# Patient Record
Sex: FEMALE | Race: BLACK OR AFRICAN AMERICAN | Employment: UNEMPLOYED | URBAN - METROPOLITAN AREA
[De-identification: names, ages, dates, MRNs, and addresses within clinical notes are randomized per-mention and may not be internally consistent; named-entity substitution may affect disease eponyms.]

---

## 2017-01-10 ENCOUNTER — TELEPHONE (OUTPATIENT)
Dept: FAMILY MEDICINE | Facility: CLINIC | Age: 52
End: 2017-01-10

## 2017-01-10 NOTE — TELEPHONE ENCOUNTER
Pt needs a P.A. For her Omega-3 1g capsules (ndc: [address]-34) qty:180 ds: 90  ID#:[id number]  Phone#: [phone]  Our NCPDP: [id number]    Thanks!  Dilshad Jaimes [facility]- Float Technician

## 2017-03-30 ENCOUNTER — HOSPITAL ENCOUNTER (EMERGENCY)
Facility: CLINIC | Age: 52
Discharge: HOME | End: 2017-03-30
Attending: FAMILY MEDICINE | Admitting: FAMILY MEDICINE
Payer: COMMERCIAL

## 2017-03-30 ENCOUNTER — APPOINTMENT (OUTPATIENT)
Dept: ULTRASOUND IMAGING | Facility: CLINIC | Age: 52
End: 2017-03-30
Payer: COMMERCIAL

## 2017-03-30 VITALS
OXYGEN SATURATION: 97 % | TEMPERATURE: 97.7 F | RESPIRATION RATE: 16 BRPM | HEART RATE: 89 BPM | DIASTOLIC BLOOD PRESSURE: 78 MMHG | SYSTOLIC BLOOD PRESSURE: 140 MMHG

## 2017-03-30 DIAGNOSIS — K29.00 OTHER ACUTE GASTRITIS: ICD-10-CM

## 2017-03-30 DIAGNOSIS — R10.13 ABDOMINAL PAIN, EPIGASTRIC: ICD-10-CM

## 2017-03-30 DIAGNOSIS — R30.0 DYSURIA: ICD-10-CM

## 2017-03-30 DIAGNOSIS — N39.0 URINARY TRACT INFECTION, SITE NOT SPECIFIED: ICD-10-CM

## 2017-03-30 DIAGNOSIS — R11.2 NON-INTRACTABLE VOMITING WITH NAUSEA, UNSPECIFIED VOMITING TYPE: ICD-10-CM

## 2017-03-30 LAB
ALBUMIN SERPL-MCNC: 3.7 G/DL (ref 3.4–5)
ALBUMIN UR-MCNC: NEGATIVE MG/DL
ALP SERPL-CCNC: 53 U/L (ref 40–150)
ALT SERPL W P-5'-P-CCNC: 19 U/L (ref 0–50)
ANION GAP SERPL CALCULATED.3IONS-SCNC: 9 MMOL/L (ref 3–14)
APPEARANCE UR: CLEAR
AST SERPL W P-5'-P-CCNC: 11 U/L (ref 0–45)
BASOPHILS # BLD AUTO: 0 10E9/L (ref 0–0.2)
BASOPHILS NFR BLD AUTO: 0.3 %
BILIRUB SERPL-MCNC: 0.4 MG/DL (ref 0.2–1.3)
BILIRUB UR QL STRIP: NEGATIVE
BUN SERPL-MCNC: 8 MG/DL (ref 7–30)
CALCIUM SERPL-MCNC: 8.9 MG/DL (ref 8.5–10.1)
CHLORIDE SERPL-SCNC: 102 MMOL/L (ref 94–109)
CO2 SERPL-SCNC: 28 MMOL/L (ref 20–32)
COLOR UR AUTO: ABNORMAL
CREAT SERPL-MCNC: 0.42 MG/DL (ref 0.52–1.04)
DIFFERENTIAL METHOD BLD: ABNORMAL
EOSINOPHIL # BLD AUTO: 0 10E9/L (ref 0–0.7)
EOSINOPHIL NFR BLD AUTO: 0.5 %
ERYTHROCYTE [DISTWIDTH] IN BLOOD BY AUTOMATED COUNT: 12.6 % (ref 10–15)
GFR SERPL CREATININE-BSD FRML MDRD: ABNORMAL ML/MIN/1.7M2
GLUCOSE BLDC GLUCOMTR-MCNC: 214 MG/DL (ref 70–99)
GLUCOSE SERPL-MCNC: 233 MG/DL (ref 70–99)
GLUCOSE UR STRIP-MCNC: 150 MG/DL
HCT VFR BLD AUTO: 38.8 % (ref 35–47)
HGB BLD-MCNC: 13.1 G/DL (ref 11.7–15.7)
HGB UR QL STRIP: NEGATIVE
IMM GRANULOCYTES # BLD: 0 10E9/L (ref 0–0.4)
IMM GRANULOCYTES NFR BLD: 0.3 %
KETONES UR STRIP-MCNC: NEGATIVE MG/DL
LEUKOCYTE ESTERASE UR QL STRIP: ABNORMAL
LYMPHOCYTES # BLD AUTO: 1.1 10E9/L (ref 0.8–5.3)
LYMPHOCYTES NFR BLD AUTO: 28.3 %
MCH RBC QN AUTO: 29.6 PG (ref 26.5–33)
MCHC RBC AUTO-ENTMCNC: 33.8 G/DL (ref 31.5–36.5)
MCV RBC AUTO: 88 FL (ref 78–100)
MONOCYTES # BLD AUTO: 0.2 10E9/L (ref 0–1.3)
MONOCYTES NFR BLD AUTO: 4.4 %
MUCOUS THREADS #/AREA URNS LPF: PRESENT /LPF
NEUTROPHILS # BLD AUTO: 2.6 10E9/L (ref 1.6–8.3)
NEUTROPHILS NFR BLD AUTO: 66.2 %
NITRATE UR QL: NEGATIVE
NRBC # BLD AUTO: 0 10*3/UL
NRBC BLD AUTO-RTO: 0 /100
PH UR STRIP: 7 PH (ref 5–7)
PLATELET # BLD AUTO: 115 10E9/L (ref 150–450)
POTASSIUM SERPL-SCNC: 3.3 MMOL/L (ref 3.4–5.3)
PROT SERPL-MCNC: 7.1 G/DL (ref 6.8–8.8)
RBC # BLD AUTO: 4.42 10E12/L (ref 3.8–5.2)
RBC #/AREA URNS AUTO: 1 /HPF (ref 0–2)
SODIUM SERPL-SCNC: 140 MMOL/L (ref 133–144)
SP GR UR STRIP: 1 (ref 1–1.03)
SQUAMOUS #/AREA URNS AUTO: <1 /HPF (ref 0–1)
URN SPEC COLLECT METH UR: ABNORMAL
UROBILINOGEN UR STRIP-MCNC: NORMAL MG/DL (ref 0–2)
WBC # BLD AUTO: 3.9 10E9/L (ref 4–11)
WBC #/AREA URNS AUTO: 20 /HPF (ref 0–2)

## 2017-03-30 PROCEDURE — 00000146 ZZHCL STATISTIC GLUCOSE BY METER IP

## 2017-03-30 PROCEDURE — 80053 COMPREHEN METABOLIC PANEL: CPT | Performed by: EMERGENCY MEDICINE

## 2017-03-30 PROCEDURE — 81001 URINALYSIS AUTO W/SCOPE: CPT | Performed by: FAMILY MEDICINE

## 2017-03-30 PROCEDURE — 87086 URINE CULTURE/COLONY COUNT: CPT | Performed by: FAMILY MEDICINE

## 2017-03-30 PROCEDURE — 83690 ASSAY OF LIPASE: CPT | Performed by: EMERGENCY MEDICINE

## 2017-03-30 PROCEDURE — 85025 COMPLETE CBC W/AUTO DIFF WBC: CPT | Performed by: EMERGENCY MEDICINE

## 2017-03-30 PROCEDURE — 96365 THER/PROPH/DIAG IV INF INIT: CPT | Performed by: FAMILY MEDICINE

## 2017-03-30 PROCEDURE — 93005 ELECTROCARDIOGRAM TRACING: CPT | Performed by: FAMILY MEDICINE

## 2017-03-30 PROCEDURE — 93010 ELECTROCARDIOGRAM REPORT: CPT | Mod: Z6 | Performed by: FAMILY MEDICINE

## 2017-03-30 PROCEDURE — 84484 ASSAY OF TROPONIN QUANT: CPT | Performed by: EMERGENCY MEDICINE

## 2017-03-30 PROCEDURE — 99285 EMERGENCY DEPT VISIT HI MDM: CPT | Mod: 25 | Performed by: FAMILY MEDICINE

## 2017-03-30 PROCEDURE — 25000125 ZZHC RX 250: Performed by: FAMILY MEDICINE

## 2017-03-30 PROCEDURE — 96375 TX/PRO/DX INJ NEW DRUG ADDON: CPT | Performed by: FAMILY MEDICINE

## 2017-03-30 PROCEDURE — 99284 EMERGENCY DEPT VISIT MOD MDM: CPT | Mod: 25 | Performed by: FAMILY MEDICINE

## 2017-03-30 PROCEDURE — 96361 HYDRATE IV INFUSION ADD-ON: CPT | Performed by: FAMILY MEDICINE

## 2017-03-30 PROCEDURE — 76705 ECHO EXAM OF ABDOMEN: CPT

## 2017-03-30 PROCEDURE — 25000128 H RX IP 250 OP 636: Performed by: FAMILY MEDICINE

## 2017-03-30 RX ORDER — ONDANSETRON 4 MG/1
4 TABLET, ORALLY DISINTEGRATING ORAL EVERY 6 HOURS PRN
Qty: 10 TABLET | Refills: 0 | Status: SHIPPED | OUTPATIENT
Start: 2017-03-30 | End: 2017-04-02

## 2017-03-30 RX ORDER — CEFTRIAXONE 1 G/1
1 INJECTION, POWDER, FOR SOLUTION INTRAMUSCULAR; INTRAVENOUS ONCE
Status: COMPLETED | OUTPATIENT
Start: 2017-03-30 | End: 2017-03-30

## 2017-03-30 RX ORDER — CIPROFLOXACIN 500 MG/1
500 TABLET, FILM COATED ORAL 2 TIMES DAILY
Qty: 14 TABLET | Refills: 0 | Status: SHIPPED | OUTPATIENT
Start: 2017-03-30 | End: 2017-04-06

## 2017-03-30 RX ORDER — ONDANSETRON 2 MG/ML
4 INJECTION INTRAMUSCULAR; INTRAVENOUS ONCE
Status: COMPLETED | OUTPATIENT
Start: 2017-03-30 | End: 2017-03-30

## 2017-03-30 RX ADMIN — CEFTRIAXONE 1 G: 1 INJECTION, POWDER, FOR SOLUTION INTRAMUSCULAR; INTRAVENOUS at 21:49

## 2017-03-30 RX ADMIN — PANTOPRAZOLE SODIUM 40 MG: 40 INJECTION, POWDER, FOR SOLUTION INTRAVENOUS at 21:14

## 2017-03-30 RX ADMIN — ONDANSETRON 4 MG: 2 INJECTION INTRAMUSCULAR; INTRAVENOUS at 19:45

## 2017-03-30 RX ADMIN — SODIUM CHLORIDE 1000 ML: 900 INJECTION, SOLUTION INTRAVENOUS at 19:42

## 2017-03-30 ASSESSMENT — ENCOUNTER SYMPTOMS
FEVER: 0
ABDOMINAL PAIN: 1
NAUSEA: 1
CHILLS: 1
PALPITATIONS: 0
BLOOD IN STOOL: 0
ANAL BLEEDING: 0
CONSTIPATION: 0
VOMITING: 1
ACTIVITY CHANGE: 1
FATIGUE: 0
DIAPHORESIS: 0
ABDOMINAL DISTENTION: 0
UNEXPECTED WEIGHT CHANGE: 0
SHORTNESS OF BREATH: 0
APPETITE CHANGE: 1
RECTAL PAIN: 0
DIARRHEA: 0
WHEEZING: 0
COUGH: 0

## 2017-03-30 NOTE — ED NOTES
Arrived to ED d/t c/o vomiting w/o nausea, has been ongoing for 3 days, throws up 2-3 times per day after drinking liquids and taking medication, hx of DM, HTN, kidney disease, VSS upon arrival

## 2017-03-30 NOTE — ED AVS SNAPSHOT
[facility], [place], Emergency Department    [address] [place] [id number]    Phone:  [phone]                                       Sylvie Galvan   MRN: [id number]    Department:  [facility], Emergency Department   Date of Visit:  3/30/2017           After Visit Summary Signature Page     I have received my discharge instructions, and my questions have been answered. I have discussed any challenges I see with this plan with the nurse or doctor.    ..........................................................................................................................................  Patient/Patient Representative Signature      ..........................................................................................................................................  Patient Representative Print Name and Relationship to Patient    ..................................................               ................................................  Date                                            Time    ..........................................................................................................................................  Reviewed by Signature/Title    ...................................................              ..............................................  Date                                                            Time

## 2017-03-30 NOTE — ED AVS SNAPSHOT
[facility], Emergency Department    [address] [address]    Phone:  [phone]                                       Sylvie Galvan   MRN: [id number]    Department:  [facility], Emergency Department   Date of Visit:  3/30/2017           Patient Information     Date Of Birth          1965        Your diagnoses for this visit were:     Other acute gastritis     Dysuria        You were seen by Andrew Moore MD.      Follow-up Information     Follow up with Clinic, [facility] In 1 week.    Contact information:    [address] [place] [address] [phone]          Discharge Instructions         You were seen today in ED for an acute gastritis and urinary tract infection.   - Please take Zofran for nausea as needed every 6 hours and make you are drinking lot of fluids.  - Please take antibiotic Ciprofloxacin twice daily for 7 days for urinary tract infection   - please use biotene spray for dry mouth as needed  - please follow up with your primary care doctor next week Monday or Tuesday.     Gastritis or Ulcer (No Antibiotic Treatment)    Gastritis is irritation and inflammation of the stomach lining. This means the lining is red and swollen. An ulcer is an open sore in the lining of the stomach. It may also occur in the duodenum (first part of the small intestine). The causes and symptoms of gastritis and ulcers are very similar.  Causes and risk factors for both problems can include:    Long-term use of nonsteroidal anti-inflammatory drugs (NSAIDs), such as aspirin and ibuprofen    H. pylori bacteria infection    Tobacco use    Alcohol use  Symptoms for both problems can include:    Dull or burning pain in the upper part of the belly    Loss of appetite    Heartburn or upset stomach    Frequent burping    Bloated feeling    Nausea with or without vomiting  You likely had an evaluation to help determine the exact cause and extent of your problem. This may have  included a health history, exam, and certain tests.  Results showed that your problem is not due to H. pylori infection. For this reason, no antibiotics are needed as part of your treatment.  Whether your problem is found to be gastritis or an ulcer, you will still need to take other medicines, however. You will also need to follow instructions to help reduce stomach irritation so your stomach can heal.   Home care    Take any medicines you re prescribed exactly as directed. Common medicines used to treat gastritis include:    Antacids. These help neutralize the normal acids in your stomach.    Proton pump inhibitors. These block your stomach from making any acid.    H2 blockers.These reduce the amount of acid your stomach makes.    Bismuth subsalicylate. This helps protect the lining of your stomach from acid.    Avoid taking any NSAIDS during your treatment. If you take NSAID to help treat other health problems, tell your healthcare provider. He or she may need to adjust your medicine plan or change the dosage.    Don t use tobacco. Also don t drink alcohol. These products can increase the amount of acid your stomach makes. This can delay healing. It can also worsen symptoms.  Follow-up care  Follow up with your healthcare provider, or as advised. In some cases, further testing may be needed.  When to seek medical advice  Call your healthcare provider right away if any of these occur:    Fever of 100.4 F (38 C) or higher or as directed by your provider    Stomach pain that worsens or moves to the lower right part of abdomen    Extreme fatigue    Weakness or dizziness    Continued weight loss    Frequent vomiting, blood in your vomit, or coffee ground-like substance in your vomit    Black, tarry, or bloody stools  Call 911  Call 911 right away if any of these occur:    Chest pain appears or worsens, or spreads to the back, neck, shoulder, or arm    Unusually fast heart rate    Trouble breathing or  swallowing    Confusion    Extreme drowsiness or trouble waking up    Fainting    Large amounts of blood present in vomit or stool    2792-9899 [employer]. [address] [place] [address], [place] [address]. All rights reserved. This information is not intended as a substitute for professional medical care. Always follow your healthcare professional's instructions.          Dysuria with Uncertain Cause (Adult)    The urethra is the tube that allows urine to pass out of the body. In a woman, the urethra is the opening above the vagina. In men, the urethra is the opening on the tip of the penis. Dysuria is the feeling of pain or burning in the urethra when passing urine.  Dysuria can be caused by anything that irritates or inflames the urethra. This can be caused by an infection or chemical irritation. The most common cause of dysuria in adults is a bladder infection. This is diagnosed with a urine test. It requires treatment with an antibiotic.  Soaps, lotions, colognes, feminine hygiene products, and birth control jellies, creams, and foams can cause chemical irritation and dysuria. It will go away in 1 to 3 days after the last time you use these irritants.  Sexually transmitted disease (STD) from chlamydia or gonorrhea can cause dysuria. If your doctor suspects this, he or she may take a culture specimen. It will take about 3 days to get the results. Antibiotic treatment may be started before the culture test returns.  In women who have gone through menopause, dysuria can be a result of dryness in the lining of the urethra. This can be treated with hormones. Dysuria becomes long-term (chronic) when it lasts for weeks or months. You may need to see a specialist (urologist) to diagnose and treat chronic dysuria.  Home care  The following home care measures may help:    Avoid any chemicals or products that you suspect may be causing your symptoms.    If you were given a prescription medicine, take as directed  and take the entire amount.    If a culture was taken, do not have sex until you have been told that it is negative (no infection). Then follow your healthcare provider's advice to treat your condition.  If a culture was done and it is positive:    Both you and your sexual partner need to be treated, even if your partner has no symptoms.    Contact your healthcare provider or go to an urgent care clinic or the public health department to be examined and treated.    Do not have sex until both you and your partner have completed all antibiotic medicine and are told that you are no longer contagious.    Learn about  safe sex  practices and use these in the future. The safest sex is with a partner who has tested negative and only has sex with you. Condoms offer protection from spreading some STDs, including gonorrhea, chlamydia and HIV, but are not a guarantee.  Follow-up care  Follow up with your healthcare provider as advised by our staff. If a culture was taken, call as directed the result. If diagnosed with an STD, follow up with your provider or the public health department for complete STD screening, including HIV testing. For more information, contact the National STD Hotline at 125-579-3432.  When to seek medical advice  Call your healthcare provider right away if any of these occur:    No improvement after three days of treatment    Fever of 100.4 F (38 C) or higher, or as directed    Increasing back or abdominal pain    Inability to urinate because of pain    A new discharge from the urethra, vagina or penis    Painful sores on the penis    Rash or joint pain    Enlarged painful lymph nodes (lumps) in the groin    Testicle pain or swelling of the scrotum    8908-6276 [employer]. [address] [place] [address], [place] [address]. All rights reserved. This information is not intended as a substitute for professional medical care. Always follow your healthcare professional's instructions.          24 Hour  Appointment Hotline       To make an appointment at any [facility], call [phone] ([phone]). If you don't have a family doctor or clinic, we will help you find one. [facility] clinics are conveniently located to serve the needs of you and your family.             Review of your medicines      START taking        Dose / Directions Last dose taken    artificial saliva Aers spray   Dose:  1 spray   Quantity:  1 Bottle        Take 1 spray by mouth every 6 hours as needed for dry mouth   Refills:  0        ciprofloxacin 500 MG tablet   Commonly known as:  CIPRO   Dose:  500 mg   Quantity:  14 tablet        Take 1 tablet (500 mg) by mouth 2 times daily for 7 days   Refills:  0        ondansetron 4 MG ODT tab   Commonly known as:  ZOFRAN ODT   Dose:  4 mg   Quantity:  10 tablet        Take 1 tablet (4 mg) by mouth every 6 hours as needed for nausea   Refills:  0          Our records show that you are taking the medicines listed below. If these are incorrect, please call your family doctor or clinic.        Dose / Directions Last dose taken    * acetaminophen 650 MG CR tablet   Commonly known as:  TYLENOL 8 HOUR   Dose:  1300 mg   Quantity:  180 tablet        Take 2 tablets (1,300 mg) by mouth 2 times daily as needed for mild pain or fever   Refills:  1        * acetaminophen 325 MG tablet   Commonly known as:  TYLENOL   Dose:  650 mg   Quantity:  100 tablet        Take 2 tablets (650 mg) by mouth every 4 hours as needed for mild pain   Refills:  0        aspirin 81 MG chewable tablet   Dose:  81 mg   Quantity:  100 tablet        Take 1 tablet (81 mg) by mouth daily   Refills:  4        blood glucose lancets standard   Commonly known as:  no brand specified   Quantity:  1 kit        One-touch lancets. Use once daily.   Refills:  3        blood glucose monitoring meter device kit   Quantity:  1 kit        by In Vitro route daily Dispense one One Touch glucose monitor   Refills:  0        blood glucose  monitoring test strip   Commonly known as:  ONE TOUCH ULTRA   Quantity:  1 Box        Use strips to test blood sugar 3x weekly or for symptoms   Refills:  11        Blood Pressure Monitor Kit   Quantity:  1 kit        Take BP once weekly   Refills:  0        carboxymethylcellulose sodium 0.5 % Soln ophthalmic solution   Dose:  1 drop   Quantity:  10 mL   Generic drug:  carboxymethylcellulose        Place 0.05 mLs (1 drop) into both eyes 2 times daily as needed   Refills:  12        cholecalciferol 1000 UNITS capsule   Commonly known as:  vitamin  -D   Dose:  1 capsule   Quantity:  90 capsule        Take 1 capsule (1,000 Units) by mouth daily   Refills:  3        cycloSPORINE 0.05 % ophthalmic emulsion   Commonly known as:  RESTASIS   Dose:  1 drop   Quantity:  2 Box        Place 1 drop into both eyes every 12 hours   Refills:  0        dorzolamide-timolol 2-0.5 % ophthalmic solution   Commonly known as:  COSOPT   Dose:  1 drop        Place 1 drop into both eyes 2 times daily   Refills:  0        FINGERS SKIN CREAM 15-15 % Crea        Apply sparingly to affected area(s) 3 times a day   Refills:  0        INSULIN ASPART SC        Refills:  0        latanoprost 0.005 % ophthalmic solution   Commonly known as:  XALATAN   Dose:  1 drop        Place 1 drop into both eyes 2 times daily   Refills:  0        levETIRAcetam 750 MG 24 hr tablet   Commonly known as:  KEPPRA XR   Dose:  1500 mg   Quantity:  360 tablet        Take 2 tablets (1,500 mg) by mouth 2 times daily Take 2 tablets by mouth twice daily   Refills:  6        losartan 100 MG tablet   Commonly known as:  COZAAR   Dose:  100 mg   Quantity:  30 tablet        Take 1 tablet (100 mg) by mouth daily   Refills:  3        metFORMIN 1000 MG tablet   Commonly known as:  GLUCOPHAGE   Dose:  1000 mg   Quantity:  60 tablet        Take 1 tablet (1,000 mg) by mouth 2 times daily (with meals)   Refills:  2        multivitamin per tablet   Dose:  1 tablet        Take 1 tablet  by mouth daily.   Refills:  0        order for DME   Quantity:  1 Device        Equipment being ordered: Glucometer   Refills:  0        Propylene Glycol-Glycerin 1-0.3 % Soln   Dose:  1-2 drop   Quantity:  1 Bottle        Apply 1-2 drops to eye 3 times daily as needed   Refills:  2        * Notice:  This list has 2 medication(s) that are the same as other medications prescribed for you. Read the directions carefully, and ask your doctor or other care provider to review them with you.            Prescriptions were sent or printed at these locations (3 Prescriptions)                   Other Prescriptions                Printed at Department/Unit printer (3 of 3)         ondansetron (ZOFRAN ODT) 4 MG ODT tab               ciprofloxacin (CIPRO) 500 MG tablet               artificial saliva (BIOTENE MT) AERS spray                Procedures and tests performed during your visit     Procedure/Test Number of Times Performed    Abdomen US, limited (RUQ only) 1    CBC with platelets differential 2    Comprehensive metabolic panel 2    EKG 12-lead, complete 1    Glucose by meter 1    Glucose monitor nursing POCT 1    Lipase 1    Troponin I (now + 6 & 12 hrs) 1    UA with Microscopic reflex to Culture 1    Urine Culture Aerobic Bacterial 1      Orders Needing Specimen Collection     None      Pending Results     Date and Time Order Name Status Description    3/30/2017 2020 Urine Culture Aerobic Bacterial Preliminary     3/30/2017 1934 EKG 12-lead, complete Preliminary     3/30/2017 1934 Abdomen US, limited (RUQ only) Preliminary             Pending Culture Results     Date and Time Order Name Status Description    3/30/2017 2020 Urine Culture Aerobic Bacterial Preliminary             Thank you for choosing [facility]       Thank you for choosing [facility] for your care. Our goal is always to provide you with excellent care. Hearing back from our patients is one way we can continue to improve our services. Please take a few  "minutes to complete the written survey that you may receive in the mail after you visit with us. Thank you!        [email or web address] Information     [facility] lets you send messages to your doctor, view your test results, renew your prescriptions, schedule appointments and more. To sign up, go to [email or web address] . Click on \"Log in\" on the left side of the screen, which will take you to the Welcome page. Then click on \"Sign up Now\" on the right side of the page.     You will be asked to enter the access code listed below, as well as some personal information. Please follow the directions to create your username and password.     Your access code is: [email or web address]  Expires: 2017 10:46 PM     Your access code will  in 90 days. If you need help or a new code, please call your [facility] clinic or [phone].        Care EveryWhere ID     This is your Care EveryWhere ID. This could be used by other organizations to access your [facility] medical records  [id number]        After Visit Summary       This is your record. Keep this with you and show to your community pharmacist(s) and doctor(s) at your next visit.                  "

## 2017-03-31 LAB
BACTERIA SPEC CULT: NORMAL
INTERPRETATION ECG - MUSE: NORMAL
LIPASE SERPL-CCNC: 92 U/L (ref 73–393)
Lab: NORMAL
MICRO REPORT STATUS: NORMAL
SPECIMEN SOURCE: NORMAL
TROPONIN I SERPL-MCNC: NORMAL UG/L (ref 0–0.04)

## 2017-03-31 ASSESSMENT — ENCOUNTER SYMPTOMS
NECK STIFFNESS: 0
ARTHRALGIAS: 0
HEADACHES: 0
EYE REDNESS: 0
DIFFICULTY URINATING: 0
CONFUSION: 0
COLOR CHANGE: 0

## 2017-03-31 NOTE — ED PROVIDER NOTES
"  History     Chief Complaint   Patient presents with     Vomiting     HPI  Sylvie Galvan is a 49 year old female with a history of diabetes type II, hypertension, epilepsy and other chronic issues who presents today with vomiting for 3 days. Patient states that she is not able to eat or to drink for the last 3 days due to \"bad taste in the mouth\". She tried to take her medicaitons however could not keep it down. She also reports having epigastric abdominal pain, not radiating, feels like cramps. She has some chills but denies having fever. She denies having diarrhea, constipation, any urinary symptoms.  Patient is a former [facility] patient. She currently has her care thru [facility] so please see details for her current medication in Care Everywhere.      I have reviewed the Medications, Allergies, Past Medical and Surgical History, and Social History in the Epic system.    Review of Systems   Constitutional: Positive for activity change, appetite change and chills. Negative for diaphoresis, fatigue, fever and unexpected weight change.   HENT: Negative for congestion.    Eyes: Negative for redness.   Respiratory: Negative for cough, shortness of breath and wheezing.    Cardiovascular: Negative for chest pain, palpitations and leg swelling.   Gastrointestinal: Positive for abdominal pain (epigastric), nausea and vomiting (non-bloody, non-bilious). Negative for abdominal distention, anal bleeding, blood in stool, constipation, diarrhea and rectal pain.   Genitourinary: Negative for difficulty urinating.   Musculoskeletal: Negative for arthralgias and neck stiffness.   Skin: Negative for color change.   Neurological: Negative for headaches.   Psychiatric/Behavioral: Negative for confusion.   All other systems reviewed and are negative.      Physical Exam   BP: (!) 154/91  Pulse: 98  Temp: 97.7  F (36.5  C)  Resp: 16  SpO2: 98 %  Physical Exam   Constitutional: She is oriented to person, place, and time. She appears " well-developed and well-nourished. She appears distressed.   HENT:   Head: Normocephalic and atraumatic.   Mouth/Throat: Uvula is midline.   Dry oral mucosa   Eyes: Conjunctivae are normal. No scleral icterus.   Neck: Normal range of motion. Neck supple.   Cardiovascular: Normal rate, regular rhythm and normal heart sounds.    No murmur heard.  Pulmonary/Chest: Effort normal and breath sounds normal. No respiratory distress. She has no wheezes. She has no rales. She exhibits no tenderness.   Abdominal: Soft. Bowel sounds are normal. She exhibits no distension (epigastric abdominal pain , slightly radiating to the RUQ, no positive Boyd sign) and no mass. There is tenderness. There is no rebound and no guarding.   Musculoskeletal: Normal range of motion. She exhibits no edema or tenderness.   Neurological: She is alert and oriented to person, place, and time.   Skin: Skin is warm and dry. No rash noted. She is not diaphoretic. No erythema. No pallor.   Psychiatric: She has a normal mood and affect.   Nursing note and vitals reviewed.      ED Course     ED Course     Procedures         Patient evaluated in the ER an IV established patient received IV fluid normal saline bolus in ER.  Labs reviewed.  RUQ US: Normal ultrasound of the right upper quadrant on preliminary read  Urinalysis concerning for infection patient given a gram of Rocephin IV in the ER.  Patient received Protonix along with Zofran IV with improving symptoms.  EKG without ischemic changes       EKG Interpretation:      Interpreted by Ana Laura Wilson  Time reviewed: 7: 46  Symptoms at time of EKG: epigastric pain  Rhythm: normal sinus   Rate: normal  Axis: normal  Ectopy: none  Conduction: normal  ST Segments/ T Waves: No ST-T wave changes  Q Waves: none  Comparison to prior: No old EKG available    Clinical Impression: normal EKG       Labs Ordered and Resulted from Time of ED Arrival Up to the Time of Departure from the ED   CBC WITH PLATELETS  DIFFERENTIAL - Abnormal; Notable for the following:        Result Value    WBC 3.9 (*)     Platelet Count 115 (*)     All other components within normal limits   COMPREHENSIVE METABOLIC PANEL - Abnormal; Notable for the following:     Potassium 3.3 (*)     Glucose 233 (*)     Creatinine 0.42 (*)     All other components within normal limits   GLUCOSE BY METER - Abnormal; Notable for the following:     Glucose 214 (*)     All other components within normal limits   ROUTINE UA WITH MICROSCOPIC REFLEX TO CULTURE - Abnormal; Notable for the following:     Glucose Urine 150 (*)     Leukocyte Esterase Urine Large (*)     WBC Urine 20 (*)     Mucous Urine Present (*)     All other components within normal limits   GLUCOSE MONITOR NURSING POCT   CBC WITH PLATELETS DIFFERENTIAL   COMPREHENSIVE METABOLIC PANEL   LIPASE   TROPONIN I   URINE CULTURE AEROBIC BACTERIAL     Results for orders placed or performed during the hospital encounter of 03/30/17   Abdomen US, limited (RUQ only)    Narrative    Resident preliminary report:   Normal ultrasound of the right upper quadrant.   CBC with platelets differential   Result Value Ref Range    WBC 3.9 (L) 4.0 - 11.0 10e9/L    RBC Count 4.42 3.8 - 5.2 10e12/L    Hemoglobin 13.1 11.7 - 15.7 g/dL    Hematocrit 38.8 35.0 - 47.0 %    MCV 88 78 - 100 fl    MCH 29.6 26.5 - 33.0 pg    MCHC 33.8 31.5 - 36.5 g/dL    RDW 12.6 10.0 - 15.0 %    Platelet Count 115 (L) 150 - 450 10e9/L    Diff Method Automated Method     % Neutrophils 66.2 %    % Lymphocytes 28.3 %    % Monocytes 4.4 %    % Eosinophils 0.5 %    % Basophils 0.3 %    % Immature Granulocytes 0.3 %    Nucleated RBCs 0 0 /100    Absolute Neutrophil 2.6 1.6 - 8.3 10e9/L    Absolute Lymphocytes 1.1 0.8 - 5.3 10e9/L    Absolute Monocytes 0.2 0.0 - 1.3 10e9/L    Absolute Eosinophils 0.0 0.0 - 0.7 10e9/L    Absolute Basophils 0.0 0.0 - 0.2 10e9/L    Abs Immature Granulocytes 0.0 0 - 0.4 10e9/L    Absolute Nucleated RBC 0.0    Comprehensive  metabolic panel   Result Value Ref Range    Sodium 140 133 - 144 mmol/L    Potassium 3.3 (L) 3.4 - 5.3 mmol/L    Chloride 102 94 - 109 mmol/L    Carbon Dioxide 28 20 - 32 mmol/L    Anion Gap 9 3 - 14 mmol/L    Glucose 233 (H) 70 - 99 mg/dL    Urea Nitrogen 8 7 - 30 mg/dL    Creatinine 0.42 (L) 0.52 - 1.04 mg/dL    GFR Estimate >90  Non  GFR Calc   >60 mL/min/1.7m2    GFR Estimate If Black >90   GFR Calc   >60 mL/min/1.7m2    Calcium 8.9 8.5 - 10.1 mg/dL    Bilirubin Total 0.4 0.2 - 1.3 mg/dL    Albumin 3.7 3.4 - 5.0 g/dL    Protein Total 7.1 6.8 - 8.8 g/dL    Alkaline Phosphatase 53 40 - 150 U/L    ALT 19 0 - 50 U/L    AST 11 0 - 45 U/L   Glucose by meter   Result Value Ref Range    Glucose 214 (H) 70 - 99 mg/dL   UA with Microscopic reflex to Culture   Result Value Ref Range    Color Urine Light Yellow     Appearance Urine Clear     Glucose Urine 150 (A) NEG mg/dL    Bilirubin Urine Negative NEG    Ketones Urine Negative NEG mg/dL    Specific Gravity Urine 1.005 1.003 - 1.035    Blood Urine Negative NEG    pH Urine 7.0 5.0 - 7.0 pH    Protein Albumin Urine Negative NEG mg/dL    Urobilinogen mg/dL Normal 0.0 - 2.0 mg/dL    Nitrite Urine Negative NEG    Leukocyte Esterase Urine Large (A) NEG    Source Midstream Urine     WBC Urine 20 (H) 0 - 2 /HPF    RBC Urine 1 0 - 2 /HPF    Squamous Epithelial /HPF Urine <1 0 - 1 /HPF    Mucous Urine Present (A) NEG /LPF   EKG 12-lead, complete   Result Value Ref Range    Interpretation ECG Click View Image link to view waveform and result    Urine Culture Aerobic Bacterial   Result Value Ref Range    Specimen Description Midstream Urine     Special Requests Specimen received in preservative     Culture Micro Pending     Micro Report Status Pending        Assessments & Plan (with Medical Decision Making)   Sylvie Galvan is a 49 year old female with a history of diabetes type II, hypertension, epilepsy and other chronic issues who presents today  with vomiting for 3 days. Her blood glucose was elevated however no signs of HHS. There is mild hypokalemia (patient is on replacement). She is diagnosed with UTI and an acute gastritis. RUQ US did not reveal any pathology. Patient was treated with IV fluids, IV Zofran and was given 1 g Ceftriaxone IV for UTI. She also was given one dose of PPI IV. Her symptoms improved and she was able to tolerate oral fluids. Patient was discharged to home with oral Zofran for nausea and Ciprofloxacin 500 mg twice daily for 7 days for UTI treatment. Patient was advised to follow up with her PCP next week.  I have reviewed the nursing notes.    I have reviewed the findings, diagnosis, plan and need for follow up with the patient.    Discharge Medication List as of 3/30/2017 10:46 PM      START taking these medications    Details   ondansetron (ZOFRAN ODT) 4 MG ODT tab Take 1 tablet (4 mg) by mouth every 6 hours as needed for nausea, Disp-10 tablet, R-0, Local Print      ciprofloxacin (CIPRO) 500 MG tablet Take 1 tablet (500 mg) by mouth 2 times daily for 7 days, Disp-14 tablet, R-0, Local Print      artificial saliva (BIOTENE MT) AERS spray Take 1 spray by mouth every 6 hours as needed for dry mouth, Disp-1 Bottle, R-0, Local Print             Final diagnoses:   Other acute gastritis   Dysuria   Non-intractable vomiting with nausea, unspecified vomiting type     Patient was seen and discussed with Dr Moore.     Ana Laura Wilson MD  [facility],  G2 Family Medicine Resident  #[id number]    This data collected with the Resident working in the Emergency Department.  Patient was seen and evaluated by myself and I repeated the history and physical exam with the patient.  The plan of care was discussed with them.  The key portions of the note including the entire assessment and plan reflect my documentation.      Andrew Moore MD.    3/30/2017   [facility], [place], EMERGENCY DEPARTMENT    This note was created  at least in part by the use of dragon voice dictation system. Inadvertent typographical errors may still exist.  Andrew Moore MD.         Andrew Moore MD  03/31/17 0222

## 2017-03-31 NOTE — DISCHARGE INSTRUCTIONS
You were seen today in ED for an acute gastritis and urinary tract infection.   - Please take Zofran for nausea as needed every 6 hours and make you are drinking lot of fluids.  - Please take antibiotic Ciprofloxacin twice daily for 7 days for urinary tract infection   - please use biotene spray for dry mouth as needed  - please follow up with your primary care doctor next week Monday or Tuesday.     Gastritis or Ulcer (No Antibiotic Treatment)    Gastritis is irritation and inflammation of the stomach lining. This means the lining is red and swollen. An ulcer is an open sore in the lining of the stomach. It may also occur in the duodenum (first part of the small intestine). The causes and symptoms of gastritis and ulcers are very similar.  Causes and risk factors for both problems can include:    Long-term use of nonsteroidal anti-inflammatory drugs (NSAIDs), such as aspirin and ibuprofen    H. pylori bacteria infection    Tobacco use    Alcohol use  Symptoms for both problems can include:    Dull or burning pain in the upper part of the belly    Loss of appetite    Heartburn or upset stomach    Frequent burping    Bloated feeling    Nausea with or without vomiting  You likely had an evaluation to help determine the exact cause and extent of your problem. This may have included a health history, exam, and certain tests.  Results showed that your problem is not due to H. pylori infection. For this reason, no antibiotics are needed as part of your treatment.  Whether your problem is found to be gastritis or an ulcer, you will still need to take other medicines, however. You will also need to follow instructions to help reduce stomach irritation so your stomach can heal.   Home care    Take any medicines you re prescribed exactly as directed. Common medicines used to treat gastritis include:    Antacids. These help neutralize the normal acids in your stomach.    Proton pump inhibitors. These block your stomach from  making any acid.    H2 blockers.These reduce the amount of acid your stomach makes.    Bismuth subsalicylate. This helps protect the lining of your stomach from acid.    Avoid taking any NSAIDS during your treatment. If you take NSAID to help treat other health problems, tell your healthcare provider. He or she may need to adjust your medicine plan or change the dosage.    Don t use tobacco. Also don t drink alcohol. These products can increase the amount of acid your stomach makes. This can delay healing. It can also worsen symptoms.  Follow-up care  Follow up with your healthcare provider, or as advised. In some cases, further testing may be needed.  When to seek medical advice  Call your healthcare provider right away if any of these occur:    Fever of 100.4 F (38 C) or higher or as directed by your provider    Stomach pain that worsens or moves to the lower right part of abdomen    Extreme fatigue    Weakness or dizziness    Continued weight loss    Frequent vomiting, blood in your vomit, or coffee ground-like substance in your vomit    Black, tarry, or bloody stools  Call 911  Call 911 right away if any of these occur:    Chest pain appears or worsens, or spreads to the back, neck, shoulder, or arm    Unusually fast heart rate    Trouble breathing or swallowing    Confusion    Extreme drowsiness or trouble waking up    Fainting    Large amounts of blood present in vomit or stool    2926-7531 [employer]. [address] [place] [address]. All rights reserved. This information is not intended as a substitute for professional medical care. Always follow your healthcare professional's instructions.          Dysuria with Uncertain Cause (Adult)    The urethra is the tube that allows urine to pass out of the body. In a woman, the urethra is the opening above the vagina. In men, the urethra is the opening on the tip of the penis. Dysuria is the feeling of pain or burning in the urethra when  passing urine.  Dysuria can be caused by anything that irritates or inflames the urethra. This can be caused by an infection or chemical irritation. The most common cause of dysuria in adults is a bladder infection. This is diagnosed with a urine test. It requires treatment with an antibiotic.  Soaps, lotions, colognes, feminine hygiene products, and birth control jellies, creams, and foams can cause chemical irritation and dysuria. It will go away in 1 to 3 days after the last time you use these irritants.  Sexually transmitted disease (STD) from chlamydia or gonorrhea can cause dysuria. If your doctor suspects this, he or she may take a culture specimen. It will take about 3 days to get the results. Antibiotic treatment may be started before the culture test returns.  In women who have gone through menopause, dysuria can be a result of dryness in the lining of the urethra. This can be treated with hormones. Dysuria becomes long-term (chronic) when it lasts for weeks or months. You may need to see a specialist (urologist) to diagnose and treat chronic dysuria.  Home care  The following home care measures may help:    Avoid any chemicals or products that you suspect may be causing your symptoms.    If you were given a prescription medicine, take as directed and take the entire amount.    If a culture was taken, do not have sex until you have been told that it is negative (no infection). Then follow your healthcare provider's advice to treat your condition.  If a culture was done and it is positive:    Both you and your sexual partner need to be treated, even if your partner has no symptoms.    Contact your healthcare provider or go to an urgent care clinic or the public health department to be examined and treated.    Do not have sex until both you and your partner have completed all antibiotic medicine and are told that you are no longer contagious.    Learn about  safe sex  practices and use these in the future.  The safest sex is with a partner who has tested negative and only has sex with you. Condoms offer protection from spreading some STDs, including gonorrhea, chlamydia and HIV, but are not a guarantee.  Follow-up care  Follow up with your healthcare provider as advised by our staff. If a culture was taken, call as directed the result. If diagnosed with an STD, follow up with your provider or the public health department for complete STD screening, including HIV testing. For more information, contact the National STD Hotline at 056-500-3095.  When to seek medical advice  Call your healthcare provider right away if any of these occur:    No improvement after three days of treatment    Fever of 100.4 F (38 C) or higher, or as directed    Increasing back or abdominal pain    Inability to urinate because of pain    A new discharge from the urethra, vagina or penis    Painful sores on the penis    Rash or joint pain    Enlarged painful lymph nodes (lumps) in the groin    Testicle pain or swelling of the scrotum    3335-6970 [employer]. [address] [place] [address], [place] [address]. All rights reserved. This information is not intended as a substitute for professional medical care. Always follow your healthcare professional's instructions.

## 2018-04-03 ENCOUNTER — OFFICE VISIT (OUTPATIENT)
Dept: ALLERGY | Facility: CLINIC | Age: 53
End: 2018-04-03
Payer: COMMERCIAL

## 2018-04-03 VITALS
DIASTOLIC BLOOD PRESSURE: 87 MMHG | HEART RATE: 80 BPM | SYSTOLIC BLOOD PRESSURE: 142 MMHG | TEMPERATURE: 97 F | WEIGHT: 171.13 LBS | BODY MASS INDEX: 27.62 KG/M2

## 2018-04-03 DIAGNOSIS — R05.3 CHRONIC COUGH: Primary | ICD-10-CM

## 2018-04-03 LAB
FEF 25/75: NORMAL
FEV-1: NORMAL
FEV1/FVC: NORMAL
FVC: NORMAL

## 2018-04-03 PROCEDURE — 36415 COLL VENOUS BLD VENIPUNCTURE: CPT | Performed by: ALLERGY & IMMUNOLOGY

## 2018-04-03 PROCEDURE — 99000 SPECIMEN HANDLING OFFICE-LAB: CPT | Performed by: ALLERGY & IMMUNOLOGY

## 2018-04-03 PROCEDURE — 86003 ALLG SPEC IGE CRUDE XTRC EA: CPT | Mod: 59 | Performed by: ALLERGY & IMMUNOLOGY

## 2018-04-03 PROCEDURE — 99204 OFFICE O/P NEW MOD 45 MIN: CPT | Mod: 25 | Performed by: ALLERGY & IMMUNOLOGY

## 2018-04-03 PROCEDURE — 94010 BREATHING CAPACITY TEST: CPT | Performed by: ALLERGY & IMMUNOLOGY

## 2018-04-03 PROCEDURE — 86003 ALLG SPEC IGE CRUDE XTRC EA: CPT | Mod: 90 | Performed by: ALLERGY & IMMUNOLOGY

## 2018-04-03 RX ORDER — ALBUTEROL SULFATE 90 UG/1
2 AEROSOL, METERED RESPIRATORY (INHALATION) EVERY 4 HOURS PRN
Qty: 1 INHALER | Refills: 3 | Status: SHIPPED | OUTPATIENT
Start: 2018-04-03

## 2018-04-03 RX ORDER — BENZONATATE 200 MG/1
200 CAPSULE ORAL 3 TIMES DAILY PRN
Qty: 21 CAPSULE | Refills: 0 | Status: SHIPPED | OUTPATIENT
Start: 2018-04-03

## 2018-04-03 NOTE — PROGRESS NOTES
Sylvie Galvan was seen in the Allergy Clinic at [facility]. The following are my recommendations regarding her Chronic Cough    1. Will obtain in vitro IgE testing to seasonal and perennial aeroallergens  2. Continue second generation antihistamine such as loratadine, cetirizine, or fexofenadine once daily  3. Begin albuterol HFA, 2-4 puffs every 4 hours as needed  4. Optichamber given in clinic and appropriate inhaler and spacer technique reviewed  5. Continue omeprazole 20mg daily  6. Follow-up in 2 to 4 weeks      Sylvie Galvan is a 52 year old  female being seen today in consultation for cough. She has had a persistent cough for the past 2 months. The cough is dry, present throughout the day, and at night. Sylvie has woken her up from sleep due to the cough. She denies associated chest pain or shortness of breath. She does not recall having any illness at the time her symptoms began and has not had any known sick contacts. Last year she had similar symptoms in the spring but the symptoms eventually resolved. Sylvie took some allergy medication yesterday but does not recall the name. She has not previously been evaluated for these symptoms. She is here today with her daughter-in-law to discuss whether she may have seasonal allergies.    In addition to her cough Sylvie reports symptoms of watery eyes and rhinorrhea after she starts to cough. She denies having itchy eyes, sneezing, nasal congestion, post-nasal drainage, or sore throat. She does not have symptoms of heartburn or acid reflux.      Past Medical History:   Diagnosis Date     Anemia      Anxiety      Depressive disorder, not elsewhere classified      Diabetes mellitus (H)     type 2     Hypertension      Hypokalemia 2008    secondary to diuretic     Kidney stone 2007     Other acute reactions to stress      Palpitations      Seizures (H)      Thrombocytopenia, unspecified      Tuberculin test reaction      Family History   Problem  Relation Age of Onset     DIABETES Paternal Grandfather      DIABETES Father      Past Surgical History:   Procedure Laterality Date     C IUD,MIRENA  2006     C NONSPECIFIC PROCEDURE       x9     C NONSPECIFIC PROCEDURE  10/01    IUD inserted, removed      DILATION AND CURETTAGE, HYSTEROSCOPY DIAGNOSTIC, COMBINED  2011    Procedure:COMBINED DILATION AND CURETTAGE, HYSTEROSCOPY DIAGNOSTIC; Surgeon:MELISSA SOMMER; Location:[facility]       ENVIRONMENTAL HISTORY: [place]. The home is heated with a forced air. They does not have central air conditioning. The patient's bedroom is furnished with carpeting in bedroom and fabric window coverings.  Pets inside the house include None. There is not history of cockroach or mice infestation. There is/are 0 smokers in the house.  The house does not have a damp basement.     SOCIAL HISTORY:   Sylvie is employed as unemployed. She lives with her son and daughter.    REVIEW OF SYSTEMS:  General: negative for weight gain. negative for weight loss. negative for changes in sleep.   Eyes: positive  for itching. positive  for redness. positive  for tearing/watering.  Ears: negative for fullness. negative for hearing loss. negative for dizziness.   Nose: negative for snoring.negative for changes in smell. positive  for drainage.   Throat: negative for hoarseness. negative for sore throat. negative for trouble swallowing.   Lungs: negative for shortness of breath.negative for wheezing. negative for sputum production.   Cardiovascular: negative for chest pain. negative for swelling of ankles. negative for fast or irregular heartbeat.   Gastrointestinal: negative for nausea. negative for heartburn. negative for acid reflux.   Musculoskeletal: negative for joint pain. negative for joint stiffness. negative for joint swelling.   Neurologic: negative for seizures. negative for fainting. negative for weakness.   Psychiatric: negative  for changes in mood. negative for anxiety.   Endocrine: negative for cold intolerance. negative for heat intolerance. negative for tremors.   Hematologic: negative for easy bruising. negative for easy bleeding.  Integumentary: negative for rash. negative for scaling. negative for nail changes.       Current Outpatient Prescriptions:      blood glucose monitoring (ONE TOUCH ULTRA) test strip, Use strips to test blood sugar 3x weekly or for symptoms, Disp: 1 Box, Rfl: 11     levETIRAcetam (KEPPRA XR) 750 MG 24 hr tablet, Take 2 tablets (1,500 mg) by mouth 2 times daily Take 2 tablets by mouth twice daily, Disp: 360 tablet, Rfl: 6     metFORMIN (GLUCOPHAGE) 1000 MG tablet, Take 1 tablet (1,000 mg) by mouth 2 times daily (with meals), Disp: 60 tablet, Rfl: 2     losartan (COZAAR) 100 MG tablet, Take 1 tablet (100 mg) by mouth daily, Disp: 30 tablet, Rfl: 3     dorzolamide-timolol (COSOPT) 2-0.5 % ophthalmic solution, Place 1 drop into both eyes 2 times daily, Disp: , Rfl:      latanoprost (XALATAN) 0.005 % ophthalmic solution, Place 1 drop into both eyes 2 times daily, Disp: , Rfl:      INSULIN ASPART SC, , Disp: , Rfl:      lancets misc. KIT, One-touch lancets. Use once daily., Disp: 1 kit, Rfl: 3     order for DME, Equipment being ordered: Glucometer, Disp: 1 Device, Rfl: 0     aspirin 81 MG chewable tablet, Take 1 tablet (81 mg) by mouth daily, Disp: 100 tablet, Rfl: 4     Blood Pressure Monitor KIT, Take BP once weekly, Disp: 1 kit, Rfl: 0     cholecalciferol (VITAMIN  -D) 1000 UNITS capsule, Take 1 capsule (1,000 Units) by mouth daily, Disp: 90 capsule, Rfl: 3     blood glucose monitoring (ONE TOUCH ULTRA 2) meter device kit, by In Vitro route daily Dispense one One Touch glucose monitor, Disp: 1 kit, Rfl: 0     Propylene Glycol-Glycerin 1-0.3 % SOLN, Apply 1-2 drops to eye 3 times daily as needed, Disp: 1 Bottle, Rfl: 2     cycloSPORINE (RESTASIS) 0.05 % ophthalmic emulsion, Place 1 drop into both eyes every 12  hours, Disp: 2 Box, Rfl: 0     carboxymethylcellulose sodium (REFRESH PLUS) 0.5 % SOLN ophthalmic solution, Place 0.05 mLs (1 drop) into both eyes 2 times daily as needed, Disp: 10 mL, Rfl: 12     Multiple Vitamin (MULTIVITAMIN) per tablet, Take 1 tablet by mouth daily., Disp: , Rfl:      Emollient (FINGERS SKIN CREAM) 15-15 % CREA, Apply sparingly to affected area(s) 3 times a day, Disp: , Rfl:      artificial saliva (BIOTENE MT) AERS spray, Take 1 spray by mouth every 6 hours as needed for dry mouth, Disp: 1 Bottle, Rfl: 0     acetaminophen (TYLENOL) 325 MG tablet, Take 2 tablets (650 mg) by mouth every 4 hours as needed for mild pain, Disp: 100 tablet, Rfl: 0     acetaminophen (TYLENOL 8 HOUR) 650 MG CR tablet, Take 2 tablets (1,300 mg) by mouth 2 times daily as needed for mild pain or fever, Disp: 180 tablet, Rfl: 1  No current facility-administered medications for this visit.     Facility-Administered Medications Ordered in Other Visits:      lidocaine BUFFERED 1 % solution, , , PRN, Katelyn Valladares, LAINEY CRNA, 0.4 mL at 11/18/11 0823  Immunization History   Administered Date(s) Administered     HepB 05/15/2009, 05/05/2010     Influenza (IIV3) PF 11/13/2002, 10/09/2007, 12/09/2008, 11/12/2009, 11/11/2011, 12/03/2012, 09/27/2013     Influenza Vaccine IM 3yrs+ 4 Valent IIV4 11/08/2015     MMR 05/15/2009     Pneumococcal 23 valent 10/09/2007     TD (ADULT, 7+) 11/13/2002, 05/05/2010     TDAP Vaccine (Adacel) 10/09/2007, 05/15/2009     Tdap (Adacel,Boostrix) 10/09/2007, 05/15/2009     Varicella 05/15/2009     Varicella Pt Report Hx of Varicella/Chicken Pox 05/15/2009     Allergies   Allergen Reactions     Phenytoin Sodium          EXAM:   /87 (BP Location: Left arm, Patient Position: Sitting, Cuff Size: Adult Regular)  Pulse 80  Temp 97  F (36.1  C) (Oral)  Wt 77.6 kg (171 lb 2 oz)  BMI 27.62 kg/m2  GENERAL APPEARANCE: alert, cooperative and not in distress  SKIN: no rashes, no lesions  HEAD: atraumatic,  normocephalic  EYES: lids and lashes normal, conjunctivae and sclerae clear, pupils equal, round, reactive to light, EOM full and intact  ENT: no scars or lesions, nasal exam showed no discharge, swelling or lesions noted, otoscopy showed external auditory canals clear, tympanic membranes normal, tongue midline and normal, soft palate, uvula, and tonsils normal  NECK: no asymmetry, masses, or scars, supple without significant adenopathy  LUNGS: unlabored respirations, no intercostal retractions or accessory muscle use, clear to auscultation without rales or wheezes  HEART: regular rate and rhythm without murmurs and normal S1 and S2  MUSCULOSKELETAL: no musculoskeletal defects are noted  NEURO: no focal deficits noted  PSYCH: does not appear depressed or anxious    WORKUP: Spirometry  SPIROMETRY       FVC 2.35L     FEV1 2.01L     FEV1/FVC 85%     FEF 25%-75%  2.67L/s    These values could not be fully interpreted as predicted values were not provided    ASSESSMENT/PLAN:  Sylvie Galvan is a 52 year old female here for evaluation of chronic cough. Her cough is non-productive and she has no other active signs of infection including fever. Sylvie reports having similar symptoms last spring and inquires about possible allergies. She has no prior history of asthma and has not been treated with inhaled medications. She was counseled regarding the most common causes of chronic cough in adults. Allergy testing and evaluation could not be done today due to recent antihistamine use. We discussed obtaining in vitro IgE testing as well as empiric trial of albuterol for her symptoms. Should cough persist would consider further evaluation with imaging and trial of additional medications including inhaled steroid.    1. Will obtain in vitro IgE testing to seasonal and perennial aeroallergens  2. Continue second generation antihistamine such as loratadine, cetirizine, or fexofenadine once daily  3. Begin albuterol HFA, 2-4 puffs  every 4 hours as needed  4. Optichamber given in clinic and appropriate inhaler and spacer technique reviewed  5. Continue omeprazole 20mg daily  6. Follow-up in 2 to 4 weeks      Dasha Phan MD  Allergy/Immunology  [facility] and [place], [place]      Chart documentation done in part with Dragon Voice Recognition Software. Although reviewed after completion, some word and grammatical errors may remain.

## 2018-04-03 NOTE — MR AVS SNAPSHOT
After Visit Summary   4/3/2018    Sylvie Galvan    MRN: [id number]           Patient Information     Date Of Birth          1965        Visit Information        Provider Department      4/3/2018 10:40 AM Dasha Phan MD [facility]        Today's Diagnoses     Cough    -  1      Care Instructions    If you have any questions regarding your allergies, asthma, or what we discussed during your visit today please call the allergy clinic or contact us via [employer].    [facility]: [phone]      Follow-up in 2 to 4 weeks if you are still coughing      Use the albuterol inhaler every 4 hours as needed for cough or shortness of breath. Take 2 puffs and if you are still coughing you can take 2 more.      Using an Inhaler with a Spacer  To control asthma, you need to use your medicines the right way. Some medicines are inhaled using a device called a metered-dose inhaler (MDI). Metered-dose inhalers deliver medicine with a fine spray. You may be asked to use a spacer (holding tube) with your inhaler. The spacer helps make sure all the medicine you need goes into your lungs.   Steps for using an inhaler with a spacer  Step 1:    Remove the caps from the inhaler and spacer.    Shake the inhaler well and attach the spacer. If the inhaler is being used for the first time or has not been used for a while, prime it as directed by the product maker.  Step 2:    Breathe out normally.    Put the spacer between your teeth. Close your lips tightly around it.    Keep your chin up.  Step 3:    Spray 1 puff into the spacer by pressing down on the inhaler.    Then breathe in through your mouth as slowly and deeply as you can. This should take about 5-10 seconds. If you breathe too quickly, you may hear a whistling sound in certain spacers.  Step 4:    Take the spacer out of your mouth.    Hold your breath for a count of 10.    Then hold your lips together and slowly breathe out  "through your mouth.          If you re prescribed more than 1 puff of medicine at a time, wait at least 30 seconds between puffs. This number may be different for different medicines. Shake the inhaler again. Then repeat steps 2 to 4.   Date Last Reviewed: 10/1/2016    0902-9932 [employer]. [address] [place] [address]. All rights reserved. This information is not intended as a substitute for professional medical care. Always follow your healthcare professional's instructions.                Follow-ups after your visit        Follow-up notes from your care team     Return in about 4 weeks (around 5/1/2018).      Who to contact     If you have questions or need follow up information about today's clinic visit or your schedule please contact [facility] directly at [phone].  Normal or non-critical lab and imaging results will be communicated to you by [employer], letter or phone within 4 business days after the clinic has received the results. If you do not hear from us within 7 days, please contact the clinic through [employer] or phone. If you have a critical or abnormal lab result, we will notify you by phone as soon as possible.  Submit refill requests through [employer] or call your pharmacy and they will forward the refill request to us. Please allow 3 business days for your refill to be completed.          Additional Information About Your Visit        [employer] Information     [employer] lets you send messages to your doctor, view your test results, renew your prescriptions, schedule appointments and more. To sign up, go to [email or web address] . Click on \"Log in\" on the left side of the screen, which will take you to the Welcome page. Then click on \"Sign up Now\" on the right side of the page.     You will be asked to enter the access code listed below, as well as some personal information. Please follow the directions to create your username and password.     Your access " code is: [email or web address]  Expires: 2018 11:47 AM     Your access code will  in 90 days. If you need help or a new code, please call your [facility] clinic or [phone].        Care EveryWhere ID     This is your Care EveryWhere ID. This could be used by other organizations to access your [facility] medical records  [id number]        Your Vitals Were     Pulse Temperature BMI (Body Mass Index)             80 97  F (36.1  C) (Oral) 27.62 kg/m2          Blood Pressure from Last 3 Encounters:   18 142/87   17 140/78   12/08/15 (!) 135/92    Weight from Last 3 Encounters:   18 77.6 kg (171 lb 2 oz)   12/08/15 80.7 kg (178 lb)   12/01/15 81.7 kg (180 lb 3.2 oz)              We Performed the Following     Allergen alternaria alternata IgE     Allergen daksha white IgE     Allergen aspergillus fumigatus IgE     Allergen cat epithellium IgE     Allergen Cedar IgE     Allergen cladosporium herbarum IgE     Allergen cottonwood IgE     Allergen D farinae IgE     Allergen D pteronyssinus IgE     Allergen dog epithelium IgE     Allergen elm IgE     Allergen English plantain IgE     Allergen Epicoccum purpurascens IgE     Allergen giant ragweed IgE     Allergen Adin grass IgE     Allergen lamb's quarter IgE     Allergen maple box elder IgE     Allergen Mugwort IgE     Allergen oak white IgE     Allergen orchard grass IgE     Allergen penicillium notatum IgE     Allergen ragweed short IgE     Allergen Red [place] IgE     Allergen Sagebrush Wormwood IgE     Allergen Sheep Sorrel IgE     Allergen silver  birch IgE     Allergen thistle Russian IgE     Allergen gilberto IgE     Allergen Tree White [place] IgE     Allergen Greenock Tree     Allergen Weed Nettle IgE     Allergen white pine IgE     Allergen, Kochia/Firebush     OPTICHAMBER     Spirometry, Breathing Capacity          Today's Medication Changes          These changes are accurate as of 4/3/18 11:47 AM.  If you have any questions, ask your nurse or  doctor.               Start taking these medicines.        Dose/Directions    albuterol 108 (90 BASE) MCG/ACT Inhaler   Commonly known as:  PROAIR HFA/PROVENTIL HFA/VENTOLIN HFA   Used for:  Cough   Started by:  Dasha Phan MD        Dose:  2 puff   Inhale 2 puffs into the lungs every 4 hours as needed   Quantity:  1 Inhaler   Refills:  3       benzonatate 200 MG capsule   Commonly known as:  TESSALON   Used for:  Cough   Started by:  Dasha Phan MD        Dose:  200 mg   Take 1 capsule (200 mg) by mouth 3 times daily as needed for cough   Quantity:  21 capsule   Refills:  0            Where to get your medicines      These medications were sent to [facility]/pharmacy #[id number] - [place], [place] - [address], [place] [address]     Phone:  [phone]     albuterol 108 (90 BASE) MCG/ACT Inhaler    benzonatate 200 MG capsule                Primary Care Provider Office Phone # Fax #    [facility] [phone]       [address] [place] [address]        Equal Access to Services     [facility]: Hadii betina dueñas hadasho Soomaali, waaxda luqadaha, qaybta kaalmada adeegyada, waxzara kasper . So [facility] [phone].    ATENCIÓN: Si habla español, tiene a huffman disposición servicios gratuitos de asistencia lingüística. Llame al [phone].    We comply with applicable federal civil rights laws and [place] laws. We do not discriminate on the basis of race, color, national origin, age, disability, sex, sexual orientation, or gender identity.            Thank you!     Thank you for choosing [facility]  for your care. Our goal is always to provide you with excellent care. Hearing back from our patients is one way we can continue to improve our services. Please take a few minutes to complete the written survey that you may receive in the mail after your visit with us. Thank you!             Your Updated Medication List -  Protect others around you: Learn how to safely use, store and throw away your medicines at www.disposemymeds.org.          This list is accurate as of 4/3/18 11:47 AM.  Always use your most recent med list.                   Brand Name Dispense Instructions for use Diagnosis    * acetaminophen 650 MG CR tablet    TYLENOL 8 HOUR    180 tablet    Take 2 tablets (1,300 mg) by mouth 2 times daily as needed for mild pain or fever    Myalgia and myositis, unspecified       * acetaminophen 325 MG tablet    TYLENOL    100 tablet    Take 2 tablets (650 mg) by mouth every 4 hours as needed for mild pain    Knee pain, unspecified laterality       albuterol 108 (90 BASE) MCG/ACT Inhaler    PROAIR HFA/PROVENTIL HFA/VENTOLIN HFA    1 Inhaler    Inhale 2 puffs into the lungs every 4 hours as needed    Cough       artificial saliva Aers spray     1 Bottle    Take 1 spray by mouth every 6 hours as needed for dry mouth        aspirin 81 MG chewable tablet     100 tablet    Take 1 tablet (81 mg) by mouth daily    Healthcare maintenance       benzonatate 200 MG capsule    TESSALON    21 capsule    Take 1 capsule (200 mg) by mouth 3 times daily as needed for cough    Cough       blood glucose lancets standard    no brand specified    1 kit    One-touch lancets. Use once daily.    Type 2 diabetes mellitus with complication (H)       blood glucose monitoring meter device kit     1 kit    by In Vitro route daily Dispense one One Touch glucose monitor    Type 2 diabetes, HbA1c goal < 7% (H)       blood glucose monitoring test strip    ONETOUCH ULTRA    1 Box    Use strips to test blood sugar 3x weekly or for symptoms    Type 2 diabetes mellitus without complication, without long-term current use of insulin (H)       Blood Pressure Monitor Kit     1 kit    Take BP once weekly    Hypertension       carboxymethylcellulose sodium 0.5 % Soln ophthalmic solution   Generic drug:  Carboxymethylcellulose Sod PF     10 mL    Place 0.05 mLs (1 drop)  into both eyes 2 times daily as needed    Glaucoma       cholecalciferol 1000 UNITS capsule    vitamin  -D    90 capsule    Take 1 capsule (1,000 Units) by mouth daily    Vitamin D deficiency       cycloSPORINE 0.05 % ophthalmic emulsion    RESTASIS    2 Box    Place 1 drop into both eyes every 12 hours    Glaucoma       dorzolamide-timolol 2-0.5 % ophthalmic solution    COSOPT     Place 1 drop into both eyes 2 times daily        FINGERS SKIN CREAM 15-15 % Crea      Apply sparingly to affected area(s) 3 times a day        INSULIN ASPART SC           latanoprost 0.005 % ophthalmic solution    XALATAN     Place 1 drop into both eyes 2 times daily        levETIRAcetam 750 MG 24 hr tablet    KEPPRA XR    360 tablet    Take 2 tablets (1,500 mg) by mouth 2 times daily Take 2 tablets by mouth twice daily    Seizure disorder (H)       losartan 100 MG tablet    COZAAR    30 tablet    Take 1 tablet (100 mg) by mouth daily    Benign essential hypertension       metFORMIN 1000 MG tablet    GLUCOPHAGE    60 tablet    Take 1 tablet (1,000 mg) by mouth 2 times daily (with meals)    Type 2 diabetes mellitus with other diabetic ophthalmic complication       multivitamin per tablet      Take 1 tablet by mouth daily.        order for DME     1 Device    Equipment being ordered: Glucometer    Type 2 diabetes mellitus with complication (H)       Propylene Glycol-Glycerin 1-0.3 % Soln     1 Bottle    Apply 1-2 drops to eye 3 times daily as needed    Tearing eyes, bilateral       * Notice:  This list has 2 medication(s) that are the same as other medications prescribed for you. Read the directions carefully, and ask your doctor or other care provider to review them with you.

## 2018-04-03 NOTE — Clinical Note
4/3/2018         RE: Alejandra Galvan  1968 [facility] [id number]        Dear Colleague,    Thank you for referring your patient, Alejandra Galvan, to the [facility]. Please see a copy of my visit note below.    Alejandra Galvan was seen in the Allergy Clinic at [facility]. The following are my recommendations regarding her {Allergydiagnoses:760327}    Alejandra Galvan is a 52 year old  female being seen today in consultation for cough. She has had a persistent cough for the past 2 months. The cough is dry, present throughout the day and at night. Has woken her up from sleep. She denies chest pain or shortness of breath. alejandra has never had a cough like this in the past. She does not recall any illness at the time her symptoms began and has not had any known sick contacts. Last year she had similar symptoms but the symptoms eventually resolved. She took some allergy medication yesterday - not sure which one it was. Has not seen the doctor for these symptoms. Never used nasal sprays or inhalers.    Denies itchy eyes but has watery eyes. Denies sneezing, rhinorrhea but has rhinorrhea when she starts to cough. Denies nasal congestion, sore throat, post-nasal drainage. Denies heartburn but her stomach is always making noises, denies abdominal pain.      Past Medical History:   Diagnosis Date     Anemia      Anxiety      Depressive disorder, not elsewhere classified      Diabetes mellitus (H)     type 2     Hypertension      Hypokalemia     secondary to diuretic     Kidney stone      Other acute reactions to stress      Palpitations      Seizures (H)      Thrombocytopenia, unspecified      Tuberculin test reaction      Family History   Problem Relation Age of Onset     DIABETES Paternal Grandfather      DIABETES Father      Past Surgical History:   Procedure Laterality Date     C IUD,MIRENA  2006     C NONSPECIFIC PROCEDURE       x9     C NONSPECIFIC  PROCEDURE  10/01    IUD inserted, removed 6/04     DILATION AND CURETTAGE, HYSTEROSCOPY DIAGNOSTIC, COMBINED  11/18/2011    Procedure:COMBINED DILATION AND CURETTAGE, HYSTEROSCOPY DIAGNOSTIC; Surgeon:MELISSA SOMMER; Location:[facility]       ENVIRONMENTAL HISTORY: [place]. The home is heated with a forced air. They does not have central air conditioning. The patient's bedroom is furnished with carpeting in bedroom and fabric window coverings.  Pets inside the house include None. There is not history of cockroach or mice infestation. There is/are 0 smokers in the house.  The house does not have a damp basement.     SOCIAL HISTORY:   Sylvie is employed as unemployed. She lives with her son and daughter.    REVIEW OF SYSTEMS:  General: negative for weight gain. negative for weight loss. negative for changes in sleep.   Eyes: positive  for itching. positive  for redness. positive  for tearing/watering.  Ears: negative for fullness. negative for hearing loss. negative for dizziness.   Nose: negative for snoring.negative for changes in smell. positive  for drainage.   Throat: negative for hoarseness. negative for sore throat. negative for trouble swallowing.   Lungs: negative for shortness of breath.negative for wheezing. negative for sputum production.   Cardiovascular: negative for chest pain. negative for swelling of ankles. negative for fast or irregular heartbeat.   Gastrointestinal: negative for nausea. negative for heartburn. negative for acid reflux.   Musculoskeletal: negative for joint pain. negative for joint stiffness. negative for joint swelling.   Neurologic: negative for seizures. negative for fainting. negative for weakness.   Psychiatric: negative for changes in mood. negative for anxiety.   Endocrine: negative for cold intolerance. negative for heat intolerance. negative for tremors.   Hematologic: negative for easy bruising. negative for easy  bleeding.  Integumentary: negative for rash. negative for scaling. negative for nail changes.       Current Outpatient Prescriptions:      blood glucose monitoring (ONE TOUCH ULTRA) test strip, Use strips to test blood sugar 3x weekly or for symptoms, Disp: 1 Box, Rfl: 11     levETIRAcetam (KEPPRA XR) 750 MG 24 hr tablet, Take 2 tablets (1,500 mg) by mouth 2 times daily Take 2 tablets by mouth twice daily, Disp: 360 tablet, Rfl: 6     metFORMIN (GLUCOPHAGE) 1000 MG tablet, Take 1 tablet (1,000 mg) by mouth 2 times daily (with meals), Disp: 60 tablet, Rfl: 2     losartan (COZAAR) 100 MG tablet, Take 1 tablet (100 mg) by mouth daily, Disp: 30 tablet, Rfl: 3     dorzolamide-timolol (COSOPT) 2-0.5 % ophthalmic solution, Place 1 drop into both eyes 2 times daily, Disp: , Rfl:      latanoprost (XALATAN) 0.005 % ophthalmic solution, Place 1 drop into both eyes 2 times daily, Disp: , Rfl:      INSULIN ASPART SC, , Disp: , Rfl:      lancets misc. KIT, One-touch lancets. Use once daily., Disp: 1 kit, Rfl: 3     order for DME, Equipment being ordered: Glucometer, Disp: 1 Device, Rfl: 0     aspirin 81 MG chewable tablet, Take 1 tablet (81 mg) by mouth daily, Disp: 100 tablet, Rfl: 4     Blood Pressure Monitor KIT, Take BP once weekly, Disp: 1 kit, Rfl: 0     cholecalciferol (VITAMIN  -D) 1000 UNITS capsule, Take 1 capsule (1,000 Units) by mouth daily, Disp: 90 capsule, Rfl: 3     blood glucose monitoring (ONE TOUCH ULTRA 2) meter device kit, by In Vitro route daily Dispense one One Touch glucose monitor, Disp: 1 kit, Rfl: 0     Propylene Glycol-Glycerin 1-0.3 % SOLN, Apply 1-2 drops to eye 3 times daily as needed, Disp: 1 Bottle, Rfl: 2     cycloSPORINE (RESTASIS) 0.05 % ophthalmic emulsion, Place 1 drop into both eyes every 12 hours, Disp: 2 Box, Rfl: 0     carboxymethylcellulose sodium (REFRESH PLUS) 0.5 % SOLN ophthalmic solution, Place 0.05 mLs (1 drop) into both eyes 2 times daily as needed, Disp: 10 mL, Rfl: 12      Multiple Vitamin (MULTIVITAMIN) per tablet, Take 1 tablet by mouth daily., Disp: , Rfl:      Emollient (FINGERS SKIN CREAM) 15-15 % CREA, Apply sparingly to affected area(s) 3 times a day, Disp: , Rfl:      artificial saliva (BIOTENE MT) AERS spray, Take 1 spray by mouth every 6 hours as needed for dry mouth, Disp: 1 Bottle, Rfl: 0     acetaminophen (TYLENOL) 325 MG tablet, Take 2 tablets (650 mg) by mouth every 4 hours as needed for mild pain, Disp: 100 tablet, Rfl: 0     acetaminophen (TYLENOL 8 HOUR) 650 MG CR tablet, Take 2 tablets (1,300 mg) by mouth 2 times daily as needed for mild pain or fever, Disp: 180 tablet, Rfl: 1  No current facility-administered medications for this visit.     Facility-Administered Medications Ordered in Other Visits:      lidocaine BUFFERED 1 % solution, , , PRN, Katelyn Valladares, LAINEY CRNA, 0.4 mL at 11/18/11 0823  Immunization History   Administered Date(s) Administered     HepB 05/15/2009, 05/05/2010     Influenza (IIV3) PF 11/13/2002, 10/09/2007, 12/09/2008, 11/12/2009, 11/11/2011, 12/03/2012, 09/27/2013     Influenza Vaccine IM 3yrs+ 4 Valent IIV4 11/08/2015     MMR 05/15/2009     Pneumococcal 23 valent 10/09/2007     TD (ADULT, 7+) 11/13/2002, 05/05/2010     TDAP Vaccine (Adacel) 10/09/2007, 05/15/2009     Tdap (Adacel,Boostrix) 10/09/2007, 05/15/2009     Varicella 05/15/2009     Varicella Pt Report Hx of Varicella/Chicken Pox 05/15/2009     Allergies   Allergen Reactions     Phenytoin Sodium          EXAM:   /87 (BP Location: Left arm, Patient Position: Sitting, Cuff Size: Adult Regular)  Pulse 80  Temp 97  F (36.1  C) (Oral)  Wt 77.6 kg (171 lb 2 oz)  BMI 27.62 kg/m2  GENERAL APPEARANCE: alert, cooperative and not in distress  SKIN: no rashes, no lesions  HEAD: atraumatic, normocephalic  EYES: lids and lashes normal, conjunctivae and sclerae clear, pupils equal, round, reactive to light, EOM full and intact  ENT: no scars or lesions, nasal exam showed no discharge,  swelling or lesions noted, otoscopy showed external auditory canals clear, tympanic membranes normal, tongue midline and normal, soft palate, uvula, and tonsils normal  NECK: no asymmetry, masses, or scars, supple without significant adenopathy  LUNGS: unlabored respirations, no intercostal retractions or accessory muscle use, clear to auscultation without rales or wheezes  HEART: regular rate and rhythm without murmurs and normal S1 and S2  MUSCULOSKELETAL: no musculoskeletal defects are noted  NEURO: no focal deficits noted  PSYCH: does not appear depressed or anxious    WORKUP: Spirometry  SPIROMETRY       FVC 2.35L     FEV1 2.01L     FEV1/FVC 85%     FEF 25%-75%  2.67L/s    These values could not be fully interpreted as predicted values were not provided    ASSESSMENT/PLAN:  Sylvie Galvan is a 52 year old female    ***      Dasha Phan MD  Allergy/Immunology  [place], [place]      Chart documentation done in part with Dragon Voice Recognition Software. Although reviewed after completion, some word and grammatical errors may remain.        Again, thank you for allowing me to participate in the care of your patient.        Sincerely,        Dasha Phan MD

## 2018-04-03 NOTE — PATIENT INSTRUCTIONS
If you have any questions regarding your allergies, asthma, or what we discussed during your visit today please call the allergy clinic or contact us via [employer].    Silvia Chan/[facility]: [phone]      Follow-up in 2 to 4 weeks if you are still coughing      Use the albuterol inhaler every 4 hours as needed for cough or shortness of breath. Take 2 puffs and if you are still coughing you can take 2 more.      Using an Inhaler with a Spacer  To control asthma, you need to use your medicines the right way. Some medicines are inhaled using a device called a metered-dose inhaler (MDI). Metered-dose inhalers deliver medicine with a fine spray. You may be asked to use a spacer (holding tube) with your inhaler. The spacer helps make sure all the medicine you need goes into your lungs.   Steps for using an inhaler with a spacer  Step 1:    Remove the caps from the inhaler and spacer.    Shake the inhaler well and attach the spacer. If the inhaler is being used for the first time or has not been used for a while, prime it as directed by the product maker.  Step 2:    Breathe out normally.    Put the spacer between your teeth. Close your lips tightly around it.    Keep your chin up.  Step 3:    Spray 1 puff into the spacer by pressing down on the inhaler.    Then breathe in through your mouth as slowly and deeply as you can. This should take about 5-10 seconds. If you breathe too quickly, you may hear a whistling sound in certain spacers.  Step 4:    Take the spacer out of your mouth.    Hold your breath for a count of 10.    Then hold your lips together and slowly breathe out through your mouth.          If you re prescribed more than 1 puff of medicine at a time, wait at least 30 seconds between puffs. This number may be different for different medicines. Shake the inhaler again. Then repeat steps 2 to 4.   Date Last Reviewed: 10/1/2016    4942-4851 [employer]. [address] [facility],  [place] [address]. All rights reserved. This information is not intended as a substitute for professional medical care. Always follow your healthcare professional's instructions.

## 2018-04-04 LAB — CALIF WALNUT IGE QN: <0.1 KU/L

## 2018-04-05 LAB
A ALTERNATA IGE QN: <0.1 KU(A)/L
A FUMIGATUS IGE QN: <0.1 KU(A)/L
C HERBARUM IGE QN: <0.1 KU(A)/L
CAT DANDER IGG QN: <0.1 KU(A)/L
CEDAR IGE QN: <0.1 KU(A)/L
COCKSFOOT IGE QN: <0.1 KU(A)/L
COMMON RAGWEED IGE QN: <0.1 KU(A)/L
COTTONWOOD IGE QN: <0.1 KU(A)/L
D FARINAE IGE QN: <0.1 KU(A)/L
D PTERONYSS IGE QN: <0.1 KU(A)/L
DEPRECATED MISC ALLERGEN IGE RAST QL: NORMAL
DOG DANDER+EPITH IGE QN: <0.1 KU(A)/L
E PURPURASCENS IGE QN: <0.1 KU(A)/L
EAST WHITE PINE IGE QN: <0.1 KU(A)/L
ENGL PLANTAIN IGE QN: <0.1 KU(A)/L
FIREBUSH IGE QN: <0.1 KU(A)/L
GIANT RAGWEED IGE QN: <0.1 KU(A)/L
GOOSEFOOT IGE QN: <0.1 KU(A)/L
JOHNSON GRASS IGE QN: <0.1 KU(A)/L
MAPLE IGE QN: <0.1 KU(A)/L
MUGWORT IGE QN: <0.1 KU(A)/L
NETTLE IGE QN: <0.1 KU(A)/L
P NOTATUM IGE QN: <0.1 KU(A)/L
RED MULBERRY IGE QN: <0.1 KU(A)/L
SALTWORT IGE QN: <0.1 KU(A)/L
SHEEP SORREL IGE QN: <0.1 KU(A)/L
SILVER BIRCH IGE QN: <0.1 KU(A)/L
TIMOTHY IGE QN: <0.1 KU(A)/L
WHITE ASH IGE QN: <0.1 KU(A)/L
WHITE ELM IGE QN: <0.1 KU(A)/L
WHITE MULBERRY IGE QN: <0.1
WHITE OAK IGE QN: <0.1 KU(A)/L
WORMWOOD IGE QN: <0.1 KU(A)/L

## 2018-04-09 ENCOUNTER — TELEPHONE (OUTPATIENT)
Dept: ALLERGY | Facility: CLINIC | Age: 53
End: 2018-04-09

## 2018-04-09 NOTE — TELEPHONE ENCOUNTER
RN left message for patient to return call to RN's direct line @ [phone].    Nanette Jeronimo, RN

## 2018-04-09 NOTE — TELEPHONE ENCOUNTER
Please call patient to discuss lab results. Testing for allergies was all negative and her cough is not being caused by an allergic trigger. I would like her to continue with the albuterol and follow-up in 1 month.

## 2018-04-09 NOTE — TELEPHONE ENCOUNTER
RN spoke with patient regarding lab results. Patient verbalized understanding. No further questions or concerns.         Nanette Jeronimo RN

## 2018-05-01 RX ORDER — AMLODIPINE BESYLATE 10 MG/1
10 TABLET ORAL DAILY
COMMUNITY
Start: 2018-02-01

## 2018-05-01 RX ORDER — METOPROLOL SUCCINATE 50 MG/1
50 TABLET, EXTENDED RELEASE ORAL
COMMUNITY
Start: 2018-02-01

## 2018-05-01 RX ORDER — LAMOTRIGINE 100 MG/1
TABLET ORAL
COMMUNITY
Start: 2016-09-30

## 2018-05-01 RX ORDER — GABAPENTIN 300 MG/1
300 CAPSULE ORAL
COMMUNITY
Start: 2017-11-01

## 2018-05-01 RX ORDER — IBUPROFEN 600 MG/1
600 TABLET, FILM COATED ORAL
COMMUNITY
Start: 2017-11-01

## 2018-05-01 RX ORDER — NYSTATIN 100000/ML
100000 SUSPENSION, ORAL (FINAL DOSE FORM) ORAL
COMMUNITY
Start: 2018-02-01

## 2018-05-01 RX ORDER — ACETAMINOPHEN 500 MG
500 TABLET ORAL
COMMUNITY
Start: 2016-08-11